# Patient Record
(demographics unavailable — no encounter records)

---

## 2024-11-18 NOTE — CONSULT LETTER
[Dear  ___] : Dear  [unfilled], [Consult Letter:] : I had the pleasure of evaluating your patient, [unfilled]. [Please see my note below.] : Please see my note below. [Consult Closing:] : Thank you very much for allowing me to participate in the care of this patient.  If you have any questions, please do not hesitate to contact me. [Sincerely,] : Sincerely, [FreeTextEntry3] : William Melchor M.D.

## 2024-11-18 NOTE — PROCEDURE
[FreeTextEntry1] : Nerve conductions [FreeTextEntry2] : Acral paresthesias [FreeTextEntry3] : Electrodiagnostic testing was performed in the right upper and both lower extremities.  The radial and ulnar sensory potentials and conduction velocities were normal.  The median sensory potential was normal but there was slowing of median conduction across the wrist segment.  The median motor distal latency was prolonged but the compound muscle action potential and conduction velocity were normal.  The ulnar motor distal latency and compound muscle action potential were normal but there was mild slowing across the elbow segment.  The median and ulnar F waves were normal.  The right sural sensory potential conduction velocity were normal.  The right peroneal motor distal latency, compound muscle action potential and conduction velocity were normal.  The peroneal F wave was normal.  The tibial H reflexes were nonreactive.  Needle electromyography was not performed.  Conclusions: This was an abnormal study.  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  The absent tibial H reflexes were possibly the consequence of an early polyneuropathy or bilateral S1 radiculopathies.  There was no other electrophysiologic evidence of sensory or motor polyneuropathy, axonal or demyelinating.

## 2024-11-18 NOTE — ASSESSMENT
[FreeTextEntry1] : Mrs. Christopher is a 53-year-old with history of breast cancer status posttreatment with docetaxel, cyclophosphamide and Herceptin in 2007.  She presents with subacute right distal upper and lower extremity paresthesias and more recently left upper extremity paresthesias following an injury to her right shoulder.  Electrodiagnostic testing performed in the office today revealed no evidence to suggest a demyelinating polyneuropathy.  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  In view of her bilateral symptoms, cerebral and cervical cord processes should be excluded.  Considerations include stroke, myelitis and metastatic disease.  I suggested that she undergo stat MRIs of the brain and cervical spine.  Further management will depend upon these results and her clinical course.
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
## 2025-01-08 NOTE — HISTORY OF PRESENT ILLNESS
[de-identified] : Pt c/o Thyroid nodule and occasional hoarseness. denies dysphagia, SOB or RT exposure sonogram: Right 2.3 cm and Left 1.2 cm and 2.5 cm thyroid nodules Normal TSH, calcium 9.6 I have reviewed all old and new data and available images.  Additional information was obtained from others present at the time of visit to ensure the completeness of the history

## 2025-01-08 NOTE — CONSULT LETTER
[Dear  ___] : Dear  [unfilled], [Consult Letter:] : I had the pleasure of evaluating your patient, [unfilled]. [Please see my note below.] : Please see my note below. [Consult Closing:] : Thank you very much for allowing me to participate in the care of this patient.  If you have any questions, please do not hesitate to contact me. [Sincerely,] : Sincerely, [FreeTextEntry2] : Dr. Ermias Melchor, Dr. Nieves Sifuentes, Dr. Maxi Carney [FreeTextEntry3] : Zaire Becerra MD, FACS System Director, Endocrine Surgery [facility] Associate  Professor of Surgery [facility] [employer] at [facility] [DrEdyta  ___] : Dr. MANZANO [DrEdyta ___] : Dr. MANZANO

## 2025-01-08 NOTE — PHYSICAL EXAM
[de-identified] : 2 cm right and 2 cm left thyroid nodules, well circumscribed and mobile [Nasal Endoscopy Performed] : nasal endoscopy was performed, see procedure section for findings [Laryngoscopy Performed] : laryngoscopy was performed, see procedure section for findings [Midline] : located in midline position [Normal] : orientation to person, place, and time: normal [de-identified] : fiberoptic laryngoscopy shows normal vocal cord mobility bilaterally with no lesions noted; slight arytenoid erythema

## 2025-01-08 NOTE — REASON FOR VISIT
[Initial Consultation] : an initial consultation for [FreeTextEntry2] : Thyroid nodule [Family Member] : family member

## 2025-01-08 NOTE — CONSULT LETTER
[Dear  ___] : Dear  [unfilled], [Consult Letter:] : I had the pleasure of evaluating your patient, [unfilled]. [Please see my note below.] : Please see my note below. [Consult Closing:] : Thank you very much for allowing me to participate in the care of this patient.  If you have any questions, please do not hesitate to contact me. [Sincerely,] : Sincerely, [FreeTextEntry2] : Dr. Ermias Melchor, Dr. Nieves Sifuentes, Dr. Maxi Carney [FreeTextEntry3] : Zaire Becerra MD, FACS System Director, Endocrine Surgery [facility] Associate  Professor of Surgery [facility] [employer] at [facility] [DrEdyta  ___] : Dr. MANZANO [DrEdyta ___] : Dr. MANZANO

## 2025-01-08 NOTE — PHYSICAL EXAM
[de-identified] : 2 cm right and 2 cm left thyroid nodules, well circumscribed and mobile [Nasal Endoscopy Performed] : nasal endoscopy was performed, see procedure section for findings [Laryngoscopy Performed] : laryngoscopy was performed, see procedure section for findings [Midline] : located in midline position [Normal] : orientation to person, place, and time: normal [de-identified] : fiberoptic laryngoscopy shows normal vocal cord mobility bilaterally with no lesions noted; slight arytenoid erythema

## 2025-01-08 NOTE — HISTORY OF PRESENT ILLNESS
[de-identified] : Pt c/o Thyroid nodule and occasional hoarseness. denies dysphagia, SOB or RT exposure sonogram: Right 2.3 cm and Left 1.2 cm and 2.5 cm thyroid nodules Normal TSH, calcium 9.6 I have reviewed all old and new data and available images.  Additional information was obtained from others present at the time of visit to ensure the completeness of the history

## 2025-01-08 NOTE — ASSESSMENT
[FreeTextEntry1] : bloods drawn. requested sonogram guided fine needle aspiration biopsy of thyroid nodules with cyto tech present to confirm adequacy of specimen. to call next week for results. asked to be evaluated by GI for possible GERD as cause of voice change and arytenoid erythema. patient has been given the opportunity to ask questions, and all of the patient's questions have been answered to their satisfaction

## 2025-07-02 NOTE — ASSESSMENT
[FreeTextEntry1] : Silent laryngeal pharyngeal reflux schedule upper endoscopy no symptoms to treat procedure discussed

## 2025-07-02 NOTE — HISTORY OF PRESENT ILLNESS
[FreeTextEntry1] : Dr Becerra on scoping saw redness in larynx area and recommended upper endoscopy No symptom of gerd no hoarsenss on no med no other evlauation colon 2 years ago ok  hx of right breast cancer treated with surgery and chemo hx of false positive leobardo

## 2025-07-22 NOTE — HISTORY OF PRESENT ILLNESS
[de-identified] : telehealth visit performed with patient at home and physician in office in [facility] with no other party present on the call. patient understands the limitations of telehealth and agrees to proceed with the visit as scheduled. visit performed using audio due to lack of technology for AV communication. prior evaluation of multiple thyroid nodules. biopsy left and right nodules benign/AUS with low risk molecular cytology. denies any symptoms related to thyroid. no changes medically since last visit. for EGD later this month. recent sonogram thyroid stable.  I have reviewed all old and new data and available images.

## 2025-07-22 NOTE — ASSESSMENT
[FreeTextEntry1] : will observe. sonogram next visit. RTO 1 year or earlier if any change. patient has been given the opportunity to ask questions, and all of the patient's questions have been answered to their satisfaction